# Patient Record
Sex: FEMALE | Race: WHITE | NOT HISPANIC OR LATINO | URBAN - METROPOLITAN AREA
[De-identification: names, ages, dates, MRNs, and addresses within clinical notes are randomized per-mention and may not be internally consistent; named-entity substitution may affect disease eponyms.]

---

## 2019-01-17 ENCOUNTER — OFFICE VISIT - HEALTHEAST (OUTPATIENT)
Dept: FAMILY MEDICINE | Facility: CLINIC | Age: 49
End: 2019-01-17

## 2019-01-17 DIAGNOSIS — R00.2 PALPITATIONS: ICD-10-CM

## 2019-01-17 DIAGNOSIS — I45.10 RBBB (RIGHT BUNDLE BRANCH BLOCK): ICD-10-CM

## 2019-01-17 DIAGNOSIS — R55 SYNCOPE, UNSPECIFIED SYNCOPE TYPE: ICD-10-CM

## 2019-01-17 LAB
ATRIAL RATE - MUSE: 71 BPM
DIASTOLIC BLOOD PRESSURE - MUSE: NORMAL MMHG
INTERPRETATION ECG - MUSE: NORMAL
P AXIS - MUSE: 32 DEGREES
PR INTERVAL - MUSE: 160 MS
QRS DURATION - MUSE: 136 MS
QT - MUSE: 434 MS
QTC - MUSE: 471 MS
R AXIS - MUSE: -10 DEGREES
SYSTOLIC BLOOD PRESSURE - MUSE: NORMAL MMHG
T AXIS - MUSE: 11 DEGREES
VENTRICULAR RATE- MUSE: 71 BPM

## 2019-01-17 ASSESSMENT — MIFFLIN-ST. JEOR: SCORE: 1506.53

## 2021-05-25 ENCOUNTER — RECORDS - HEALTHEAST (OUTPATIENT)
Dept: ADMINISTRATIVE | Facility: CLINIC | Age: 51
End: 2021-05-25

## 2021-05-26 ENCOUNTER — RECORDS - HEALTHEAST (OUTPATIENT)
Dept: ADMINISTRATIVE | Facility: CLINIC | Age: 51
End: 2021-05-26

## 2021-05-28 ENCOUNTER — RECORDS - HEALTHEAST (OUTPATIENT)
Dept: ADMINISTRATIVE | Facility: CLINIC | Age: 51
End: 2021-05-28

## 2021-05-29 ENCOUNTER — RECORDS - HEALTHEAST (OUTPATIENT)
Dept: ADMINISTRATIVE | Facility: CLINIC | Age: 51
End: 2021-05-29

## 2021-06-01 ENCOUNTER — RECORDS - HEALTHEAST (OUTPATIENT)
Dept: ADMINISTRATIVE | Facility: CLINIC | Age: 51
End: 2021-06-01

## 2021-06-02 VITALS — WEIGHT: 197 LBS | BODY MASS INDEX: 32.82 KG/M2 | HEIGHT: 65 IN

## 2021-06-23 NOTE — PROGRESS NOTES
"Chief Complaint   Patient presents with     Heart flutter     started last night and still feeling it today, fainted twice this morning        HPI:  Pauline Bailey is a 48 y.o. female who presents today complaining of a heart flutter sensation that started today when she woke up. Patient states that she has also fainted twice this morning. She did not fall, she was sitting on her bed when the syncope occurred. She has had this before and she was on a Holter monitor. That was 15 years ago; and all she remembers is that her heart was \"fine\".  The intensity has decreased since this morning. The palpitations occur in moments of a few seconds every other minute. When it occurs she feels \"woosy\" (-) arm pain, shortness of breath, CP, leg swelling, nausea or vomiting. She reports that she has increased her caffeine intake lately. She has not had any personal hx of HTN or hyperlipidemia. Her father had a MI, but he was a very heavy smoker .      History obtained from the patient.    Problem List:  There are no relevant problems documented for this patient.      No past medical history on file.    Social History     Tobacco Use     Smoking status: Never Smoker     Smokeless tobacco: Never Used   Substance Use Topics     Alcohol use: Not on file       Review of Systems   Respiratory: Negative for shortness of breath.    Cardiovascular: Positive for palpitations (episodic every other minute). Negative for chest pain.   Gastrointestinal: Negative for nausea and vomiting.   Neurological: Positive for syncope (2 episodes).       Vitals:    01/17/19 0725   BP: 149/82   Patient Site: Right Arm   Patient Position: Sitting   Cuff Size: Adult Regular   Pulse: 64   Resp: 16   Temp: 98.1  F (36.7  C)   TempSrc: Oral   SpO2: 96%   Weight: 197 lb (89.4 kg)   Height: 5' 4.5\" (1.638 m)       Physical Exam   Constitutional: She appears well-developed and well-nourished. No distress.   HENT:   Head: Normocephalic and atraumatic.   Right " Ear: External ear normal.   Left Ear: External ear normal.   Eyes: Conjunctivae are normal. Right eye exhibits no discharge. Left eye exhibits no discharge.   Pulmonary/Chest: Effort normal. No respiratory distress.   Skin: She is not diaphoretic.   Psychiatric: She has a normal mood and affect. Her behavior is normal. Judgment and thought content normal.       Labs:  Recent Results (from the past 72 hour(s))   Electrocardiogram Perform - Clinic   Result Value Ref Range    SYSTOLIC BLOOD PRESSURE  mmHg    DIASTOLIC BLOOD PRESSURE  mmHg    VENTRICULAR RATE 71 BPM    ATRIAL RATE 71 BPM    P-R INTERVAL 160 ms    QRS DURATION 136 ms    Q-T INTERVAL 434 ms    QTC CALCULATION (BEZET) 471 ms    P Axis 32 degrees    R AXIS -10 degrees    T AXIS 11 degrees    MUSE DIAGNOSIS       Sinus rhythm with occasional Premature ventricular complexes  Right bundle branch block  Abnormal ECG  When compared with ECG of 22-JUN-2009 11:44,  Premature ventricular complexes are now Present  Right bundle branch block is now Present       Clinical Decision Making:  New RBBB noted on today's EKG. Since this has corresponded with 2 syncopal episodes she should be evaluated in the ED for MI r/o. Patient came with her  who will drive her.      At the end of the encounter, I discussed results, diagnosis, medications. Discussed red flags for immediate return to clinic/ER, as well as indications for follow up if no improvement. Patient understood and agreed to plan. Patient was stable for discharge.    1. RBBB (right bundle branch block)     2. Palpitations  Electrocardiogram Perform - Clinic   3. Syncope, unspecified syncope type           Patient Instructions   Complete your workup at [facility] ED.